# Patient Record
Sex: FEMALE | Race: WHITE | NOT HISPANIC OR LATINO | Employment: UNEMPLOYED | URBAN - METROPOLITAN AREA
[De-identification: names, ages, dates, MRNs, and addresses within clinical notes are randomized per-mention and may not be internally consistent; named-entity substitution may affect disease eponyms.]

---

## 2019-01-21 ENCOUNTER — THERAPY VISIT (OUTPATIENT)
Dept: PHYSICAL THERAPY | Facility: CLINIC | Age: 64
End: 2019-01-21
Payer: COMMERCIAL

## 2019-01-21 DIAGNOSIS — M25.552 HIP PAIN, LEFT: Primary | ICD-10-CM

## 2019-01-21 PROCEDURE — 97110 THERAPEUTIC EXERCISES: CPT | Mod: GP | Performed by: PHYSICAL THERAPIST

## 2019-01-21 PROCEDURE — 97161 PT EVAL LOW COMPLEX 20 MIN: CPT | Mod: GP | Performed by: PHYSICAL THERAPIST

## 2019-01-21 PROCEDURE — 97140 MANUAL THERAPY 1/> REGIONS: CPT | Mod: GP | Performed by: PHYSICAL THERAPIST

## 2019-01-21 ASSESSMENT — ACTIVITIES OF DAILY LIVING (ADL)
GOING_UP_1_FLIGHT_OF_STAIRS: SLIGHT DIFFICULTY
HOS_ADL_SCORE(%): 67.19
PUTTING_ON_SOCKS_AND_SHOES: NO DIFFICULTY AT ALL
LIGHT_TO_MODERATE_WORK: SLIGHT DIFFICULTY
DEEP_SQUATTING: SLIGHT DIFFICULTY
RECREATIONAL_ACTIVITIES: SLIGHT DIFFICULTY
STANDING_FOR_15_MINUTES: SLIGHT DIFFICULTY
HOS_ADL_COUNT: 16
WALKING_15_MINUTES_OR_GREATER: MODERATE DIFFICULTY
SITTING_FOR_15_MINUTES: NO DIFFICULTY AT ALL
HOS_ADL_HIGHEST_POTENTIAL_SCORE: 64
WALKING_UP_STEEP_HILLS: MODERATE DIFFICULTY
WALKING_DOWN_STEEP_HILLS: MODERATE DIFFICULTY
HEAVY_WORK: SLIGHT DIFFICULTY
HOW_WOULD_YOU_RATE_YOUR_CURRENT_LEVEL_OF_FUNCTION_DURING_YOUR_USUAL_ACTIVITIES_OF_DAILY_LIVING_FROM_0_TO_100_WITH_100_BEING_YOUR_LEVEL_OF_FUNCTION_PRIOR_TO_YOUR_HIP_PROBLEM_AND_0_BEING_THE_INABILITY_TO_PERFORM_ANY_OF_YOUR_USUAL_DAILY_ACTIVITIES?: 60
TWISTING/PIVOTING_ON_INVOLVED_LEG: SLIGHT DIFFICULTY
HOS_ADL_ITEM_SCORE_TOTAL: 43
WALKING_INITIALLY: MODERATE DIFFICULTY
WALKING_APPROXIMATELY_10_MINUTES: MODERATE DIFFICULTY
STEPPING_UP_AND_DOWN_CURBS: SLIGHT DIFFICULTY
ROLLING_OVER_IN_BED: SLIGHT DIFFICULTY
GOING_DOWN_1_FLIGHT_OF_STAIRS: SLIGHT DIFFICULTY
GETTING_INTO_AND_OUT_OF_AN_AVERAGE_CAR: SLIGHT DIFFICULTY

## 2019-01-21 NOTE — PROGRESS NOTES
[facility] Initial Evaluation  Subjective:  She has been dealing with L hip pain since early 2019.  Unknown cause.  She typically does a lot of walking for exercise. In the summer she average ~4-5 miles/day, in the winter she averages ~3 miles. She typically does all of her miles on sidewalks/blacktops.  She does wear yaktraks for traction on her shoes.  The pain started when she would get and and start walking.  The pain will improve some after a couple of steps.  The pain will increase with walking greater distances.  The pain occasionally will refer christelle the leg and into the groin.   Pain will increase is she sleeps on her sides.  Feels better with sitting.      The history is provided by the patient. No  was used.   Shanique Garcia is a 63 year old female with a left hip condition.  Condition occurred with:  Insidious onset.  Condition occurred: for unknown reasons.  This is a new condition     Patient reports pain:  Lateral.  Radiates to:  Thigh (groin).  Pain is described as aching (throbbing) and is intermittent and reported as 4/10.  Associated symptoms:  Catching and loss of strength. Pain is the same all the time.  Symptoms are exacerbated by lying on extremity, walking and ascending stairs and relieved by rest.  Since onset symptoms are unchanged.  Special tests:  X-ray (neg for OA).      General health as reported by patient is good.  Pertinent medical history includes:  Menopausal.  Medical allergies: yes (penecillin, contrast dye).  Other surgeries include:  None reported.  Current medications:  None as reported by the patient.  Current occupation is Retired    Pt goal: resume her normal walking routine.  Patient is currently not working due to present treatment problem.          Red flags:  None as reported by the patient.                        Objective:  System    Physical Exam    General     ROS  Shanique Garcia , : 1955, MRN:  [id number]    Physical Therapy Objective Findings  Subjective information, goals, clinical impression, daily documentation and other information found in EPISODES tab.  Hip Evaluation  Objective:  Posture: flat thoracic spine, decreased lumbar lordosis  Gait: pelvic collapse B at IC  Lumbar Range of Motion:  Flexion                                                100%                                                                                                                           Extension 80%   Right Side Bending 80%   Left Side Bending 80%   Repeated extension-standing    Repeated flexion- standing      Pelvic screen:                                                                  Positive                                            Negative                                            Standing Forward Bend  x   Gillet(March)  x   Supine to sit     Sacroiliac provocation test     Pubic symphysis provocation            -resisted hip add at 45     Other:       Functional Screen: mod medial collapse with single limb squat B  Range of motion:                                        Right AROM            Right PROM            Left AROM              Left PROM                Hip flex wnl  wnl    Hip abd wnl  wnl    Hip add wnl  wnl    Hip ER wnl  wnl    Hip IR wnl  wnl    Hip ext wnl  wnl    Manual Muscle Testing  (graded 0-5, measured at 0 degrees unless otherwise noted):                                                                Right                                                  Left                                                       Hip flex  4+ 4+   Hip abd 4- 4-   Hip ext 4 4   Hip IR 4- 4-   Hip ER 3+ 3+   Knee flex 5 5   Knee ext 5 5   Other:     +=mild pain, ++=mod pain, +++=severe pain  Special Tests:                                                                                                    Positive                               Negative                         Femoral Nerve Stretch Test   x   Gluteal Tendopathy Test            -pain in 20 add/abd ->glute med            -pain in 20 add/not 20 abd->bursitis X L    Resisted hip adduction            -0 degrees hip flex -> add longus            -90 degrees hip flex -> pectineus  x   Straight Leg Raise  x   Scour  x   CRISELDA  x   FADIR  x   Sign of Buttock  x   Other:       Flexibility:                                                                    Right                                                   Left                                                      Tee Test normal normal   Obers normal normal   Hamstring SLR 80 SLR 80   piriformis normal normal   Prone Quad normal normal   Other:       Palpation: tender L glute med tendinopathy    Assessment/Plan:    Patient is a 63 year old female with left side hip complaints.    Patient has the following significant findings with corresponding treatment plan.                Diagnosis 1:  L hip pain consistent with glute med tendinopathy  Pain -  hot/cold therapy, manual therapy, self management, education and home program  Decreased strength - therapeutic exercise, therapeutic activities and home program  Impaired gait - gait training and home program    Therapy Evaluation Codes:   1) History comprised of:   Personal factors that impact the plan of care:      Age.    Comorbidity factors that impact the plan of care are:      None.     Medications impacting care: None.  2) Examination of Body Systems comprised of:   Body structures and functions that impact the plan of care:      Hip.   Activity limitations that impact the plan of care are:      Stairs, Walking and Sleeping.  3) Clinical presentation characteristics are:   Stable/Uncomplicated.  4) Decision-Making    Low complexity using standardized patient assessment instrument and/or measureable assessment of functional outcome.  Cumulative Therapy Evaluation is: Low complexity.    Previous and current functional limitations:  (See Goal Flow Sheet  for this information)    Short term and Long term goals: (See Goal Flow Sheet for this information)     Communication ability:  Patient appears to be able to clearly communicate and understand verbal and written communication and follow directions correctly.  Treatment Explanation - The following has been discussed with the patient:   RX ordered/plan of care  Anticipated outcomes  Possible risks and side effects  This patient would benefit from PT intervention to resume normal activities.   Rehab potential is good.    Frequency:  1 X week, once daily, 2 weeks, then pt will be out of town for 1 month.  1x/week for 4 weeks upon return  Duration:  for 10 weeks  Discharge Plan:  Achieve all LTG.  Independent in home treatment program.  Reach maximal therapeutic benefit.    Please refer to the daily flowsheet for treatment today, total treatment time and time spent performing 1:1 timed codes.

## 2019-01-21 NOTE — LETTER
[facility] PHYSICAL THERAPY  [address]  [facility] [id number] [phone]    2019    Re: Shanique Garcia   :   1955  MRN:  [id number]   REFERRING PHYSICIAN:   Bandar Barillas    [facility]    Date of Initial Evaluation:  19  Visits:  Rxs Used: 1  Reason for Referral:  Hip pain, left    EVALUATION SUMMARY    [facility] Initial Evaluation  Subjective:  She has been dealing with L hip pain since early 2019.  Unknown cause.  She typically does a lot of walking for exercise. In the summer she average ~4-5 miles/day, in the winter she averages ~3 miles. She typically does all of her miles on sidewalks/blacktops.  She does wear yaktraks for traction on her shoes.  The pain started when she would get and and start walking.  The pain will improve some after a couple of steps.  The pain will increase with walking greater distances.  The pain occasionally will refer christelle the leg and into the groin.   Pain will increase is she sleeps on her sides.  Feels better with sitting.    The history is provided by the patient. No  was used.   Shanique Garcia is a 63 year old female with a left hip condition.  Condition occurred with:  Insidious onset.  Condition occurred: for unknown reasons.  This is a new condition     Patient reports pain:  Lateral.  Radiates to:  Thigh (groin).  Pain is described as aching (throbbing) and is intermittent and reported as 4/10.  Associated symptoms:  Catching and loss of strength. Pain is the same all the time.  Symptoms are exacerbated by lying on extremity, walking and ascending stairs and relieved by rest.  Since onset symptoms are unchanged.  Special tests:  X-ray (neg for OA).      General health as reported by patient is good.  Pertinent medical history includes:  Menopausal.  Medical allergies: yes (penecillin, contrast dye).  Other  surgeries include:  None reported.  Current medications:  None as reported by the patient.  Current occupation is Retired    Pt goal: resume her normal walking routine.  Patient is currently not working due to present treatment problem.      Red flags:  None as reported by the patient.             Objective:    Shanique Garcia , : 1955, MRN: [id number]    Physical Therapy Objective Findings  Subjective information, goals, clinical impression, daily documentation and other information found in EPISODES tab.  Hip Evaluation  Objective:  Posture: flat thoracic spine, decreased lumbar lordosis  Gait: pelvic collapse B at IC  Lumbar Range of Motion:  Flexion                                                100%                                                                                                                           Extension 80%   Right Side Bending 80%   Left Side Bending 80%   Repeated extension-standing    Repeated flexion- standing    Pelvic screen:                                                                  Positive                                            Negative                                            Standing Forward Bend  x   Gillet(March)  x   Supine to sit     Sacroiliac provocation test     Pubic symphysis provocation            -resisted hip add at 45     Other:       Functional Screen: mod medial collapse with single limb squat B  Range of motion:                                        Right AROM            Right PROM            Left AROM              Left PROM                Hip flex wnl  wnl    Hip abd wnl  wnl    Hip add wnl  wnl    Hip ER wnl  wnl    Hip IR wnl  wnl    Hip ext wnl  wnl    Manual Muscle Testing  (graded 0-5, measured at 0 degrees unless otherwise noted):                                                                Right                                                  Left                                                       Hip flex  4+ 4+   Hip abd 4-  4-   Hip ext 4 4   Hip IR 4- 4-   Hip ER 3+ 3+   Knee flex 5 5   Knee ext 5 5   Other:     +=mild pain, ++=mod pain, +++=severe pain  Special Tests:                                                                                                    Positive                               Negative                         Femoral Nerve Stretch Test  x   Gluteal Tendopathy Test            -pain in 20 add/abd ->glute med            -pain in 20 add/not 20 abd->bursitis X L    Resisted hip adduction            -0 degrees hip flex -> add longus            -90 degrees hip flex -> pectineus  x   Straight Leg Raise  x   Scour  x   CRISELDA  x   FADIR  x   Sign of Buttock  x   Other:       Flexibility:                                                                    Right                                                   Left                                                      Tee Test normal normal   Obers normal normal   Hamstring SLR 80 SLR 80   piriformis normal normal   Prone Quad normal normal   Other:       Palpation: tender L glute med tendinopathy    Assessment/Plan:    Patient is a 63 year old female with left side hip complaints.    Patient has the following significant findings with corresponding treatment plan.                Diagnosis 1:  L hip pain consistent with glute med tendinopathy  Pain -  hot/cold therapy, manual therapy, self management, education and home program  Decreased strength - therapeutic exercise, therapeutic activities and home program  Impaired gait - gait training and home program    Therapy Evaluation Codes:   1) History comprised of:   Personal factors that impact the plan of care:      Age.    Comorbidity factors that impact the plan of care are:      None.     Medications impacting care: None.  2) Examination of Body Systems comprised of:   Body structures and functions that impact the plan of care:      Hip.   Activity limitations that impact the plan of care are:      Stairs, Walking  and Sleeping.  3) Clinical presentation characteristics are:   Stable/Uncomplicated.  4) Decision-Making    Low complexity using standardized patient assessment instrument and/or measureable assessment of functional outcome.  Cumulative Therapy Evaluation is: Low complexity.    Previous and current functional limitations:  (See Goal Flow Sheet for this information)    Short term and Long term goals: (See Goal Flow Sheet for this information)     Communication ability:  Patient appears to be able to clearly communicate and understand verbal and written communication and follow directions correctly.  Treatment Explanation - The following has been discussed with the patient:   RX ordered/plan of care  Anticipated outcomes  Possible risks and side effects  This patient would benefit from PT intervention to resume normal activities.   Rehab potential is good.    Frequency:  1 X week, once daily, 2 weeks, then pt will be out of town for 1 month.  1x/week for 4 weeks upon return  Duration:  for 10 weeks  Discharge Plan:  Achieve all LTG.  Independent in home treatment program.  Reach maximal therapeutic benefit.    Thank you for your referral.    INQUIRIES  Therapist: Daniel OSEI, [facility]   [facility]  [address] [place] [address] [address]  [facility] [address]  Phone: [phone]  Fax: [phone]

## 2019-01-28 ENCOUNTER — THERAPY VISIT (OUTPATIENT)
Dept: PHYSICAL THERAPY | Facility: CLINIC | Age: 64
End: 2019-01-28
Payer: COMMERCIAL

## 2019-01-28 DIAGNOSIS — M25.552 HIP PAIN, LEFT: ICD-10-CM

## 2019-01-28 PROCEDURE — 97035 APP MDLTY 1+ULTRASOUND EA 15: CPT | Mod: GP | Performed by: PHYSICAL THERAPIST

## 2019-01-28 PROCEDURE — 97110 THERAPEUTIC EXERCISES: CPT | Mod: GP | Performed by: PHYSICAL THERAPIST

## 2019-01-28 PROCEDURE — 97140 MANUAL THERAPY 1/> REGIONS: CPT | Mod: GP | Performed by: PHYSICAL THERAPIST

## 2019-03-04 ENCOUNTER — THERAPY VISIT (OUTPATIENT)
Dept: PHYSICAL THERAPY | Facility: CLINIC | Age: 64
End: 2019-03-04
Payer: COMMERCIAL

## 2019-03-04 DIAGNOSIS — M25.552 HIP PAIN, LEFT: ICD-10-CM

## 2019-03-04 PROCEDURE — 97140 MANUAL THERAPY 1/> REGIONS: CPT | Mod: GP | Performed by: PHYSICAL THERAPIST

## 2019-03-04 PROCEDURE — 97110 THERAPEUTIC EXERCISES: CPT | Mod: GP | Performed by: PHYSICAL THERAPIST

## 2019-03-04 NOTE — PROGRESS NOTES
Subjective:  HPI                    Objective:  System    Physical Exam    General     ROS    Assessment/Plan:    PROGRESS  REPORT    Progress reporting period is from 1-28-19 to 3-4-19.       SUBJECTIVE  Subjective: Pt reports she is not better since last visit, no change in the pain. Pt pointing to the IT band and into the lateral quads as the primary area of the pain. Pain is worst wtih sit to stand, standing and walking. No pain with sitting and a rest. States did a lot of hiking up hills and had no pain, but had pain with walking on level surfaces in a straight line.     Current pain level is Current Pain level: 0/10(with walking 5/10 ).     Previous pain level was  0-4/10 Initial Pain level: 4/10.   Changes in function:  None  Adverse reaction to treatment or activity: None    OBJECTIVE  Changes noted in objective findings:    Objective: Low R iliac crest, low R PSIS, (+) R glietts test, (+) R standing flexion test, (+) L clavicular jump test for L up slip.      ASSESSMENT/PLAN  Updated problem list and treatment plan: Diagnosis 1:  Hip pain L   Pain -  hot/cold therapy, manual therapy, self management, education, directional preference exercise and home program  Inflammation - cold therapy, US and self management/home program  Impaired gait - gait training and home program  Impaired muscle performance - neuro re-education and home program  Decreased function - therapeutic activities and home program  STG/LTGs have been met or progress has been made towards goals:  Yes (See Goal flow sheet completed today.)  Assessment of Progress: The patient's condition is improving.  The patient's condition has potential to improve.  Self Management Plans:  Patient has been instructed in a home treatment program.  Patient  has been instructed in self management of symptoms.    Shanique continues to require the following intervention to meet STG and LTG's:  PT    Recommendations:  This patient would benefit from continued  therapy.     Frequency:  2 X week, once daily  Duration:  for 1 weeks tapering to 1 X a week over 4 weeks        Please refer to the daily flowsheet for treatment today, total treatment time and time spent performing 1:1 timed codes.

## 2019-03-07 ENCOUNTER — THERAPY VISIT (OUTPATIENT)
Dept: PHYSICAL THERAPY | Facility: CLINIC | Age: 64
End: 2019-03-07
Payer: COMMERCIAL

## 2019-03-07 DIAGNOSIS — M25.552 HIP PAIN, LEFT: ICD-10-CM

## 2019-03-07 PROCEDURE — 97110 THERAPEUTIC EXERCISES: CPT | Mod: GP | Performed by: PHYSICAL THERAPIST

## 2019-03-07 PROCEDURE — 97140 MANUAL THERAPY 1/> REGIONS: CPT | Mod: GP | Performed by: PHYSICAL THERAPIST

## 2019-03-19 ENCOUNTER — THERAPY VISIT (OUTPATIENT)
Dept: PHYSICAL THERAPY | Facility: CLINIC | Age: 64
End: 2019-03-19
Payer: COMMERCIAL

## 2019-03-19 DIAGNOSIS — M25.552 HIP PAIN, LEFT: ICD-10-CM

## 2019-03-19 PROCEDURE — 97110 THERAPEUTIC EXERCISES: CPT | Mod: GP | Performed by: PHYSICAL THERAPIST

## 2019-03-19 PROCEDURE — 97140 MANUAL THERAPY 1/> REGIONS: CPT | Mod: GP | Performed by: PHYSICAL THERAPIST

## 2019-03-19 NOTE — PROGRESS NOTES
Subjective:  HPI                    Objective:  System    Physical Exam    General     ROS    Assessment/Plan:    PROGRESS  REPORT    Progress reporting period is from 3/4/19 to 3/19/19.       SUBJECTIVE  Subjective: Pt reports overall no change, states the pain remains in the distal IT band, was better for only short time following last visit. She has tried to resume walking, having difficulty with the pain. Pain remains present only with walking and sleeping. She denies low back pain.     Current pain level is 0/10 Current Pain level: 0/10(Night 7/10).     Previous pain level was  0/10 Initial Pain level: 4/10.   Changes in function:  Temporary relief for 1-2 days following PT  Adverse reaction to treatment or activity: None    OBJECTIVE  Changes noted in objective findings:    Objective: SI joint with level landmarks, (+) shear test B. Pain reproduced with IT band stretching, hip ABD sterngth L at 3+/5. R at 4/5. Pt with (+) obers test, tightness in the proximal IT band and the tensor muscle. Primary tenderness over the proximal IT band.      ASSESSMENT/PLAN  Updated problem list and treatment plan: Diagnosis 1:  L lateral hip pain   Pain -  hot/cold therapy, manual therapy, self management, education, directional preference exercise and home program  Decreased strength - therapeutic exercise, therapeutic activities and home program  Inflammation - US and self management/home program  Impaired gait - gait training and home program  Impaired muscle performance - neuro re-education and home program  Decreased function - therapeutic activities and home program  STG/LTGs have been met or progress has been made towards goals:  Yes (See Goal flow sheet completed today.)  Assessment of Progress: The patient's condition is improving.  The patient's condition has potential to improve.  Self Management Plans:  Patient has been instructed in a home treatment program.  Patient is independent in a home treatment  program.    Shanique continues to require the following intervention to meet STG and LTG's:  PT    Recommendations:  This patient would benefit from continued therapy.     Frequency:  2 X week, once daily  Duration:  for 2 weeks tapering to 1 X a week over 3 weeks        Please refer to the daily flowsheet for treatment today, total treatment time and time spent performing 1:1 timed codes.

## 2019-03-25 ENCOUNTER — THERAPY VISIT (OUTPATIENT)
Dept: PHYSICAL THERAPY | Facility: CLINIC | Age: 64
End: 2019-03-25
Payer: COMMERCIAL

## 2019-03-25 DIAGNOSIS — M25.552 HIP PAIN, LEFT: ICD-10-CM

## 2019-03-25 PROCEDURE — 97110 THERAPEUTIC EXERCISES: CPT | Mod: GP | Performed by: PHYSICAL THERAPIST

## 2019-03-25 PROCEDURE — 97140 MANUAL THERAPY 1/> REGIONS: CPT | Mod: GP | Performed by: PHYSICAL THERAPIST

## 2019-03-28 ENCOUNTER — THERAPY VISIT (OUTPATIENT)
Dept: PHYSICAL THERAPY | Facility: CLINIC | Age: 64
End: 2019-03-28
Payer: COMMERCIAL

## 2019-03-28 DIAGNOSIS — M25.552 HIP PAIN, LEFT: ICD-10-CM

## 2019-03-28 PROCEDURE — 97140 MANUAL THERAPY 1/> REGIONS: CPT | Mod: GP | Performed by: PHYSICAL THERAPIST

## 2019-03-28 PROCEDURE — 97110 THERAPEUTIC EXERCISES: CPT | Mod: GP | Performed by: PHYSICAL THERAPIST

## 2019-04-02 ENCOUNTER — THERAPY VISIT (OUTPATIENT)
Dept: PHYSICAL THERAPY | Facility: CLINIC | Age: 64
End: 2019-04-02
Payer: COMMERCIAL

## 2019-04-02 DIAGNOSIS — M25.552 HIP PAIN, LEFT: ICD-10-CM

## 2019-04-02 PROCEDURE — 97140 MANUAL THERAPY 1/> REGIONS: CPT | Mod: GP | Performed by: PHYSICAL THERAPIST

## 2019-06-27 PROBLEM — M25.552 HIP PAIN, LEFT: Status: RESOLVED | Noted: 2019-01-21 | Resolved: 2019-06-27

## 2019-06-27 NOTE — PROGRESS NOTES
Discharge Note    Progress reporting period is from initial evaluation date (please see noted date below) to Apr 2, 2019.  Linked Episodes   Type: Episode: Status: Noted: Resolved: Last update: Updated by:   PHYSICAL THERAPY hip pain 1/21/19 Active 1/21/2019 4/2/2019 12:57 PM Daniel Colmenares, PT      Comments:       Shanique failed to follow up and current status is unknown.  Please see information below for last relevant information on current status.  Patient seen for 8 visits.    SUBJECTIVE  Subjective changes noted by patient:  Pt reports she has improved over the past s2 days, she had 2 days without issues, the pain returned today.   .  Current pain level is 0/10(remains painful to 7/10 with long term walking).     Previous pain level was  4/10.   Changes in function:  Yes (See Goal flowsheet attached for changes in current functional level)  Adverse reaction to treatment or activity: None    OBJECTIVE  Changes noted in objective findings:       ASSESSMENT/PLAN  Diagnosis: L hip pain   Updated problem list and treatment plan:   Pain - HEP  Decreased function - HEP  Inflammation - HEP  Impaired muscle performance - HEP  STG/LTGs have been met or progress has been made towards goals:  Yes, please see goal flowsheet for most current information  Assessment of Progress: current status is unknown.    Last current status:     Self Management Plans:  HEP  I have re-evaluated this patient and find that the nature, scope, duration and intensity of the therapy is appropriate for the medical condition of the patient.  Shanique continues to require the following intervention to meet STG and LTG's:  HEP.    Recommendations:  Discharge with current home program.  Patient to follow up with MD as needed.    Please refer to the daily flowsheet for treatment today, total treatment time and time spent performing 1:1 timed codes.

## 2024-01-09 ENCOUNTER — TRANSCRIBE ORDERS (OUTPATIENT)
Dept: OTHER | Age: 69
End: 2024-01-09

## 2024-01-09 DIAGNOSIS — R10.2 PELVIC AND PERINEAL PAIN: Primary | ICD-10-CM
